# Patient Record
Sex: FEMALE | Race: BLACK OR AFRICAN AMERICAN | Employment: FULL TIME | URBAN - METROPOLITAN AREA
[De-identification: names, ages, dates, MRNs, and addresses within clinical notes are randomized per-mention and may not be internally consistent; named-entity substitution may affect disease eponyms.]

---

## 2018-07-09 ENCOUNTER — HOSPITAL ENCOUNTER (OUTPATIENT)
Dept: LAB | Age: 28
Discharge: HOME | End: 2018-07-09

## 2018-07-09 PROCEDURE — 88305 TISSUE EXAM BY PATHOLOGIST: CPT | Performed by: NURSE PRACTITIONER

## 2022-10-11 ENCOUNTER — OFFICE VISIT (OUTPATIENT)
Dept: OBGYN CLINIC | Age: 32
End: 2022-10-11
Payer: COMMERCIAL

## 2022-10-11 VITALS
WEIGHT: 186 LBS | SYSTOLIC BLOOD PRESSURE: 128 MMHG | DIASTOLIC BLOOD PRESSURE: 80 MMHG | HEIGHT: 64 IN | BODY MASS INDEX: 31.76 KG/M2

## 2022-10-11 DIAGNOSIS — N90.0 VULVAR INTRAEPITHELIAL NEOPLASIA (VIN) GRADE 1: ICD-10-CM

## 2022-10-11 DIAGNOSIS — Z01.419 WELL WOMAN EXAM WITH ROUTINE GYNECOLOGICAL EXAM: Primary | ICD-10-CM

## 2022-10-11 DIAGNOSIS — Z12.4 SCREENING FOR CERVICAL CANCER: ICD-10-CM

## 2022-10-11 PROCEDURE — 99214 OFFICE O/P EST MOD 30 MIN: CPT | Performed by: OBSTETRICS & GYNECOLOGY

## 2022-10-11 PROCEDURE — 99395 PREV VISIT EST AGE 18-39: CPT | Performed by: OBSTETRICS & GYNECOLOGY

## 2022-10-11 NOTE — PATIENT INSTRUCTIONS
What you should know about HPV, Cervical Cancer, and Genital Warts    CERVICAL CANCER IS CAUSED BY CERTAIN TYPES OF A VIRUS. Cervical cancer is cancer of the cervix (the lower part of the uterus that connects to the vagina). Unlike other cancers, cervical cancer is not hereditary. It's caused by certain types of a virus, human papillomavirus (HPV). When a woman becomes infected with one of these types of HPV, and the virus doesn't go away on its own, normal cells can develop in the lining of the cervix. If these cells are not found early, precancers and then cancer can develop. CERVICAL CANCER: IT'S NOT TOO EARLY TO THINK ABOUT IT. American Cancer Society (ACS) estimates, that 27 women a day will be diagnosed with cervical cancer in the [place] in 2008. While half of all women diagnosed with cervical cancer are between 34-50 years old, many of these women could have initially been exposed to cancer-causing HPV types in their teens and 19's. THERE ARE MORE THAN 30 TYPES OF GENITAL HPV. The types of HPV that cause cervical cancer are different from the types that cause genital warts. All HPV types that affect the genital area can cause abnormal Pap test.    80% OF WOMEN WILL HAVE HAD HPV IN THEIR LIFETIME. Both men and women can have HPV, and it is easily spread. Any type of genital contact with someone who had HPV can put you at risk - intercourse isn't necessary. And since there are often no signs or symptoms, many people don't know they are passing it on. There are about 6 million new cases of genital HPV in the [place] each year. There are more than 30 types of genital HPV, and most will clear on their own. But for some women who don't clear certain types of the virus, cervical cancer can develop. There's no way to predict who will or won't clear the virus. GENITAL WARTS:  ANOTHER DISEASE CAUSED BY HPV.   While certain types of HPV can cause cervical cancer, other types can cause genital warts. Genital warts are usually soft, flesh-colored growths that can be raised or flat, small or large, alone or in clusters. There are on estimated 1 million new cases of genital warts each year in the [place]. While genital warts are not life threatening, they can be life altering. There are a number of ways to treat genital warts, including creams, removal by burning, freezing, or laser, and surgery. However, even after treatment, genital warts can return. In fact, 25% of cases return within 3 months. PROTECTION WITH GARDASIL. GARDASIL is the only cervical cancer vaccine that helps protect against 4 types of HPV:  2 types that cause 70% of cervical cancer cases, and 2 more types that cause 90% of genital warts cases. GARDASIL is for girls and young women ages 5 to 32. GARDASIL is given as 3 injections over 6 months (0, 2 months, 6 months). Getting all 3 doses will allow you or your daugter to get the full benefits of GARDASIL. GARDASIL WORKS TO HELP PREVENT ILLNESS. Like other vaccines, GARDASIL works to help prevent illness. That's why its recommended that girls 6to 15years of age (and as young as 5) get vaccinated. HPV vaccination is a part of the recommended vaccination schedule defined by the Centers for Disease Control and Prevention (CDC). Leading medical organizations recommend HPV vaccination including the American Academy of Pediatrics (AAP), the American Academy of Family Physicians (AAFP), and the [employer]nec[employer] (ACOG). IT'S NOT TOO LATE TO GET VACCINATED. Only a doctor or health care professional can tell you if GARDASIL is right for you. But, if you're already sexually active, you may still benefit from [address]. That's because even if you have been exposed to HPV, it's unlikely that you have been infected with all 4 types of the virus covered by GARDASIL. CERVICAL CANCER SCREENINGS ARE IMPORTANT.   Vaccination with GARDASIL is important, but it does not replace routine cervical cancer screenings. Pap tests look for abnormal cervical cells in the lining of the cervix before they have a chance to become precancers and then cervical cancer. Most often this change takes a number of years. But in rare cases it can happen within a year. IMPORTANT INFORMATION ABOUT GARDASIL. Anyone who is allergic to the ingredients of GARDASIL, including those severely allergic to yeast, should not receive the vaccine. GARDASIL is not for women who are pregnant. GARDASIL does not treat cervical cancer or genital warts. GARDASIL may not fully protect everyone, and does not prevent all types of cervical cancer, so it's important to continue routine cervical cancer screenings. GARDASIL will not protect against diseases caused by other HPV types or against diseases not caused by HPV. The side effects include pain, swelling, itching, bruising, and redness at the injection site, headaches, fever, nausea, dizziness, vomiting, and fainting. GARDASIL is given as 3 injections over 6 months. Only a doctor or health care professional can decide if GARDASIL is right for you or your daughter. PLEASE READ THE PATIENT INFORMATION FOR GARDASIL AND DISCUSS IT WITH YOUR DOCTOR OR HEALTH CARE PROFESSIONAL. TWENTY MINUTES TO IMPROVED BLADDER CONTROL    Millions of women experience some degree of urinary incontinence or involuntary urination. The following pelvic floor exercises, called Kegel exercises, can help you to improve bladder control. Set aside some time in the morning, afternoon, and evening. Sit comfortably upright and relax. Although this is an important exercise, there is no need to strain. Before doing Kegel exercises, it is necessary to identify the pelvic-floor muscle. Place your finger inside your vagina. Squeeze around your finger. That's the muscle you want to exercise.   Once you have isolated the muscle several times, there is no need to place your finger in the vagina to do the exercise. 1.  SQUEEZE AND HOLD FOR 10 SECONDS          Then relax for 10 seconds. Remember, it is as important to relax as it is to squeeze this muscle. 2.  WHEN TO DO THIS EXERCISE          15 times in the morning          15 times in the afternoon          20 times at night          Try to work up to doing 25 exercises at one time. Initially, you may not be able to hold and squeeze for 10 seconds. Don't get            Discouraged. Over a 2-week period your technique should improve. 3  THINGS TO REMEMBER          A. Never use your stomach, legs or buttock muscles. Place your hand on  your abdomen while you are squeezing your pelvic                Floor muscle. If you feel your abdomen move, then you are also using these muscles. B.  Build strength slowly; don't expect results right away. C.  The great thing about this exercise is that after you bcome familiar with it, it can be done anyplace. No one can you see you                 Exercing this internal muscle. 4.   WHEN YOU WILL SEE A CHANGE           After about 2 weeks of doing this exercise you should notice fewer \"accidents\".      Vitamin D3 1,000 IU  Calcium Citrate 600mg

## 2022-10-18 LAB
CYTOLOGIST CVX/VAG CYTO: NORMAL
CYTOLOGY CVX/VAG DOC THIN PREP: NORMAL
HPV REFLEX: NORMAL
Lab: NORMAL
Lab: NORMAL
PATH REPORT.FINAL DX SPEC: NORMAL
STAT OF ADQ CVX/VAG CYTO-IMP: NORMAL

## 2022-11-14 ENCOUNTER — PROCEDURE VISIT (OUTPATIENT)
Dept: OBGYN CLINIC | Age: 32
End: 2022-11-14
Payer: COMMERCIAL

## 2022-11-14 VITALS — SYSTOLIC BLOOD PRESSURE: 120 MMHG | BODY MASS INDEX: 30.9 KG/M2 | DIASTOLIC BLOOD PRESSURE: 90 MMHG | WEIGHT: 180 LBS

## 2022-11-14 DIAGNOSIS — N94.6 DYSMENORRHEA: ICD-10-CM

## 2022-11-14 DIAGNOSIS — N90.0 VULVAR INTRAEPITHELIAL NEOPLASIA (VIN) GRADE 1: ICD-10-CM

## 2022-11-14 PROCEDURE — 99214 OFFICE O/P EST MOD 30 MIN: CPT | Performed by: OBSTETRICS & GYNECOLOGY

## 2022-11-14 PROCEDURE — 56821 COLPOSCOPY VULVA W/BIOPSY: CPT | Performed by: OBSTETRICS & GYNECOLOGY

## 2022-11-14 RX ORDER — ALBUTEROL SULFATE 2.5 MG/3ML
SOLUTION RESPIRATORY (INHALATION)
COMMUNITY
Start: 2022-11-09

## 2022-11-14 NOTE — PROGRESS NOTES
The patient is here for  problems including dysmen    HISTORY:      Patient's last menstrual period was 11/01/2022 (approximate). SEE BELOW      Current Outpatient Medications on File Prior to Visit   Medication Sig Dispense Refill    budesonide-formoterol (SYMBICORT) 160-4.5 MCG/ACT AERO Inhale 2 puffs into the lungs 2 times daily      albuterol (PROVENTIL) (2.5 MG/3ML) 0.083% nebulizer solution TAKE 3 ML (2.5 MG) BY NEBULIZATION EVERY 6 (SIX) HOURS AS NEEDED FOR SHORTNESS OF BREATH       No current facility-administered medications on file prior to visit. SEE LIST    ROS:      PHYSICAL EXAM:  Blood pressure (!) 120/90, weight 180 lb (81.6 kg), last menstrual period 11/01/2022. The patient appears well, alert, oriented x 3, in no distress. Lungs are clear. Heart RRR, no murmurs. Abdomen soft without tenderness, guarding, mass or organomegaly. Pelvic: bg     ASSESSMENT:DIAGNOSIS DISCUSSED INCLUDING DIFFERENTIALdysmen     PLAN:    No orders of the defined types were placed in this encounter. Complex high risk decision making many questions answered history reviewed precharting done required 30 minute of time discussing a vaiety of problems  goals are set  follow up planned       Fiorella1 Kevin Yung    Name:  Joanna Book     Date:  11/14/2022      Patient consented and blood pressure taken . A full lengthy discussion held regarding the details of the abn findings  The  procedure was described in detail including  the pros and cons ,length of procedure,anticipated pain level,activity and habits rec after the procedure and time for discussion,questions,comments and other were allowed . Items such as care,scarring and fu were covered. Anticipated treatment options were discussed as well.   The pt was for less nervous at the end of the exam        Bg pap  hx LENARD 1    today slight acetowhite  7 oclock bx       FINDINGS:                                                                                    LORETTA GAXIOLA Michaela Campoverde MD

## 2022-11-29 ENCOUNTER — OFFICE VISIT (OUTPATIENT)
Dept: OBGYN CLINIC | Age: 32
End: 2022-11-29
Payer: COMMERCIAL

## 2022-11-29 VITALS — DIASTOLIC BLOOD PRESSURE: 70 MMHG | BODY MASS INDEX: 30.9 KG/M2 | SYSTOLIC BLOOD PRESSURE: 110 MMHG | WEIGHT: 180 LBS

## 2022-11-29 DIAGNOSIS — N90.0 VULVAR INTRAEPITHELIAL NEOPLASIA (VIN) GRADE 1: Primary | ICD-10-CM

## 2022-11-29 PROCEDURE — 99214 OFFICE O/P EST MOD 30 MIN: CPT | Performed by: OBSTETRICS & GYNECOLOGY

## 2023-07-18 ENCOUNTER — TELEPHONE (OUTPATIENT)
Dept: OBGYN CLINIC | Age: 33
End: 2023-07-18

## 2023-10-08 SDOH — ECONOMIC STABILITY: INCOME INSECURITY: HOW HARD IS IT FOR YOU TO PAY FOR THE VERY BASICS LIKE FOOD, HOUSING, MEDICAL CARE, AND HEATING?: NOT HARD AT ALL

## 2023-10-08 SDOH — ECONOMIC STABILITY: FOOD INSECURITY: WITHIN THE PAST 12 MONTHS, YOU WORRIED THAT YOUR FOOD WOULD RUN OUT BEFORE YOU GOT MONEY TO BUY MORE.: NEVER TRUE

## 2023-10-08 SDOH — ECONOMIC STABILITY: TRANSPORTATION INSECURITY
IN THE PAST 12 MONTHS, HAS LACK OF TRANSPORTATION KEPT YOU FROM MEETINGS, WORK, OR FROM GETTING THINGS NEEDED FOR DAILY LIVING?: NO

## 2023-10-08 SDOH — ECONOMIC STABILITY: HOUSING INSECURITY
IN THE LAST 12 MONTHS, WAS THERE A TIME WHEN YOU DID NOT HAVE A STEADY PLACE TO SLEEP OR SLEPT IN A SHELTER (INCLUDING NOW)?: NO

## 2023-10-08 SDOH — ECONOMIC STABILITY: FOOD INSECURITY: WITHIN THE PAST 12 MONTHS, THE FOOD YOU BOUGHT JUST DIDN'T LAST AND YOU DIDN'T HAVE MONEY TO GET MORE.: NEVER TRUE

## 2023-10-09 ENCOUNTER — OFFICE VISIT (OUTPATIENT)
Dept: OBGYN CLINIC | Age: 33
End: 2023-10-09
Payer: COMMERCIAL

## 2023-10-09 VITALS
WEIGHT: 184 LBS | BODY MASS INDEX: 31.41 KG/M2 | DIASTOLIC BLOOD PRESSURE: 80 MMHG | HEIGHT: 64 IN | SYSTOLIC BLOOD PRESSURE: 138 MMHG

## 2023-10-09 DIAGNOSIS — Z12.4 SCREENING FOR CERVICAL CANCER: ICD-10-CM

## 2023-10-09 DIAGNOSIS — Z11.3 SCREENING FOR STD (SEXUALLY TRANSMITTED DISEASE): ICD-10-CM

## 2023-10-09 DIAGNOSIS — Z11.51 SCREENING FOR HPV (HUMAN PAPILLOMAVIRUS): ICD-10-CM

## 2023-10-09 DIAGNOSIS — Z01.419 WELL WOMAN EXAM WITH ROUTINE GYNECOLOGICAL EXAM: Primary | ICD-10-CM

## 2023-10-09 PROCEDURE — 99395 PREV VISIT EST AGE 18-39: CPT | Performed by: OBSTETRICS & GYNECOLOGY

## 2023-10-10 LAB
HBV SURFACE AG SER QL: NONREACTIVE
HCV AB SER QL: NONREACTIVE
HIV 1+2 AB+HIV1 P24 AG SERPL QL IA: NONREACTIVE
HIV 1/2 RESULT COMMENT: NORMAL
RPR SER QL: NONREACTIVE

## 2023-10-15 LAB
C TRACH RRNA CVX QL NAA+PROBE: NEGATIVE
CYTOLOGIST CVX/VAG CYTO: NORMAL
CYTOLOGY CVX/VAG DOC THIN PREP: NORMAL
HPV APTIMA: NEGATIVE
HPV GENOTYPE REFLEX: NORMAL
Lab: NORMAL
N GONORRHOEA RRNA CVX QL NAA+PROBE: NEGATIVE
PATH REPORT.FINAL DX SPEC: NORMAL
STAT OF ADQ CVX/VAG CYTO-IMP: NORMAL
T VAGINALIS RRNA SPEC QL NAA+PROBE: NEGATIVE

## 2024-07-08 ENCOUNTER — PROCEDURE VISIT (OUTPATIENT)
Dept: OBGYN CLINIC | Age: 34
End: 2024-07-08
Payer: COMMERCIAL

## 2024-07-08 VITALS
SYSTOLIC BLOOD PRESSURE: 122 MMHG | BODY MASS INDEX: 29.37 KG/M2 | DIASTOLIC BLOOD PRESSURE: 70 MMHG | HEIGHT: 64 IN | WEIGHT: 172 LBS

## 2024-07-08 DIAGNOSIS — N90.4 LEUKOPLAKIA OF VULVA: ICD-10-CM

## 2024-07-08 DIAGNOSIS — N90.0 VULVAR INTRAEPITHELIAL NEOPLASIA (VIN) GRADE 1: Primary | ICD-10-CM

## 2024-07-08 PROCEDURE — 99214 OFFICE O/P EST MOD 30 MIN: CPT | Performed by: OBSTETRICS & GYNECOLOGY

## 2024-07-08 PROCEDURE — 56820 COLPOSCOPY VULVA: CPT | Performed by: OBSTETRICS & GYNECOLOGY

## 2024-07-08 NOTE — PROGRESS NOTES
The patient is here for  problems including hx devang 1    HISTORY:      No LMP recorded.SEE BELOW      Current Outpatient Medications on File Prior to Visit   Medication Sig Dispense Refill    budesonide-formoterol (SYMBICORT) 160-4.5 MCG/ACT AERO Inhale 2 puffs into the lungs 2 times daily       No current facility-administered medications on file prior to visit.   SEE LIST    ROS:      PHYSICAL EXAM:  Height 1.626 m (5' 4\").    The patient appears well, alert, oriented x 3, in no distress.  Lungs are clear. Heart RRR, no murmurs. Abdomen soft without tenderness, guarding, mass or organomegaly.  Pelvic: bg     ASSESSMENT:DIAGNOSIS DISCUSSED INCLUDING DIFFERENTIAL   Last colpo bg bx  7oclock 11/22          today decl bx  6 o clock bx pending   shyam!!!!  PLAN:    No orders of the defined types were placed in this encounter.    Complex high risk decision making many questions answered history reviewed precharting done required 30 minute of time discussing a vaiety of problems  goals are set  follow up planned

## 2024-08-26 ENCOUNTER — PROCEDURE VISIT (OUTPATIENT)
Dept: OBGYN CLINIC | Age: 34
End: 2024-08-26
Payer: COMMERCIAL

## 2024-08-26 VITALS — WEIGHT: 172 LBS | DIASTOLIC BLOOD PRESSURE: 94 MMHG | BODY MASS INDEX: 29.52 KG/M2 | SYSTOLIC BLOOD PRESSURE: 150 MMHG

## 2024-08-26 DIAGNOSIS — B97.7 HPV IN FEMALE: Primary | ICD-10-CM

## 2024-08-26 DIAGNOSIS — Z01.812 PRE-PROCEDURE LAB EXAM: ICD-10-CM

## 2024-08-26 DIAGNOSIS — N90.0 VULVAR INTRAEPITHELIAL NEOPLASIA (VIN) GRADE 1: ICD-10-CM

## 2024-08-26 LAB
HCG, PREGNANCY, URINE, POC: NEGATIVE
VALID INTERNAL CONTROL, POC: YES

## 2024-08-26 PROCEDURE — 81025 URINE PREGNANCY TEST: CPT | Performed by: OBSTETRICS & GYNECOLOGY

## 2024-08-26 PROCEDURE — 99214 OFFICE O/P EST MOD 30 MIN: CPT | Performed by: OBSTETRICS & GYNECOLOGY

## 2024-08-26 PROCEDURE — 56821 COLPOSCOPY VULVA W/BIOPSY: CPT | Performed by: OBSTETRICS & GYNECOLOGY

## 2024-08-26 RX ORDER — ALBUTEROL SULFATE 0.83 MG/ML
2.5 SOLUTION RESPIRATORY (INHALATION) EVERY 6 HOURS PRN
COMMUNITY
Start: 2024-06-26 | End: 2025-07-26

## 2024-08-26 RX ORDER — ALBUTEROL SULFATE 90 UG/1
2 AEROSOL, METERED RESPIRATORY (INHALATION) EVERY 4 HOURS PRN
COMMUNITY
Start: 2024-06-26

## 2024-08-26 RX ORDER — FLUTICASONE PROPIONATE 50 MCG
2 SPRAY, SUSPENSION (ML) NASAL
COMMUNITY
Start: 2023-06-26

## 2024-08-26 RX ORDER — CETIRIZINE HYDROCHLORIDE 10 MG/1
10 TABLET ORAL DAILY
COMMUNITY
Start: 2024-06-26 | End: 2024-09-24

## 2024-08-26 ASSESSMENT — PATIENT HEALTH QUESTIONNAIRE - PHQ9
1. LITTLE INTEREST OR PLEASURE IN DOING THINGS: NOT AT ALL
SUM OF ALL RESPONSES TO PHQ QUESTIONS 1-9: 0
SUM OF ALL RESPONSES TO PHQ9 QUESTIONS 1 & 2: 0
2. FEELING DOWN, DEPRESSED OR HOPELESS: NOT AT ALL
SUM OF ALL RESPONSES TO PHQ QUESTIONS 1-9: 0

## 2024-08-26 NOTE — PROGRESS NOTES
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     HISTORY:      Patient's last menstrual period was 2024 (exact date).SEE BELOW      Current Outpatient Medications on File Prior to Visit   Medication Sig Dispense Refill    albuterol (PROVENTIL) (2.5 MG/3ML) 0.083% nebulizer solution Inhale 3 mLs into the lungs every 6 hours as needed      albuterol sulfate HFA (PROVENTIL;VENTOLIN;PROAIR) 108 (90 Base) MCG/ACT inhaler Inhale 2 puffs into the lungs every 4 hours as needed      cetirizine (ZYRTEC) 10 MG tablet Take 1 tablet by mouth daily      fluticasone (FLONASE) 50 MCG/ACT nasal spray 2 sprays by Nasal route      budesonide-formoterol (SYMBICORT) 160-4.5 MCG/ACT AERO Inhale 2 puffs into the lungs 2 times daily       No current facility-administered medications on file prior to visit.   SEE LIST    ROS:      PHYSICAL EXAM:  Blood pressure (!) 150/94, weight 78 kg (172 lb), last menstrual period 2024.    The patient appears well, alert, oriented x 3, in no distress.  Lungs are clear. Heart RRR, no murmurs. Abdomen soft without tenderness, guarding, mass or organomegaly.  Pelvic: bg     ASSESSMENT:DIAGNOSIS DISCUSSED INCLUDING DIFFERENTIAL  Pap due   hpv in female  dx gardisil  transmission   PLAN:    No orders of the defined types were placed in this encounter.    Many questions answered history reviewed precharting done required 30 minute of time discussing a vaiety of problems  goals are set  follow up planned     Vulvar/Perineal Biopsy w colposcopy       2024     Name:  Kelli Sutton  :   1990  Age:   34 y.o.    Reason for Procedure:      Procedure:  Patient was placed in the lithotomy position.  The area was cleansed with betadine, then infiltrated with plain 1% xylocaine.  Subsequently  was performed with   The area  sutured closed with 3-0vicryl w colposcopy and acetic  hx LENARD [vulvar ]    Small dressing was applied.  Patient tolerated the procedure well.

## 2024-09-17 ENCOUNTER — OFFICE VISIT (OUTPATIENT)
Dept: OBGYN CLINIC | Age: 34
End: 2024-09-17
Payer: COMMERCIAL

## 2024-09-17 VITALS — DIASTOLIC BLOOD PRESSURE: 60 MMHG | SYSTOLIC BLOOD PRESSURE: 110 MMHG | BODY MASS INDEX: 30.55 KG/M2 | WEIGHT: 178 LBS

## 2024-09-17 DIAGNOSIS — N90.89 VULVAR LESION: Primary | ICD-10-CM

## 2024-09-17 PROCEDURE — 99214 OFFICE O/P EST MOD 30 MIN: CPT | Performed by: OBSTETRICS & GYNECOLOGY

## 2024-11-18 ENCOUNTER — OFFICE VISIT (OUTPATIENT)
Dept: OBGYN CLINIC | Age: 34
End: 2024-11-18
Payer: COMMERCIAL

## 2024-11-18 VITALS — BODY MASS INDEX: 30.9 KG/M2 | WEIGHT: 180 LBS | DIASTOLIC BLOOD PRESSURE: 68 MMHG | SYSTOLIC BLOOD PRESSURE: 110 MMHG

## 2024-11-18 DIAGNOSIS — Z12.4 SCREENING FOR CERVICAL CANCER: ICD-10-CM

## 2024-11-18 DIAGNOSIS — Z01.419 WELL WOMAN EXAM WITH ROUTINE GYNECOLOGICAL EXAM: Primary | ICD-10-CM

## 2024-11-18 DIAGNOSIS — Z11.51 SCREENING FOR HPV (HUMAN PAPILLOMAVIRUS): ICD-10-CM

## 2024-11-18 PROCEDURE — 99395 PREV VISIT EST AGE 18-39: CPT | Performed by: OBSTETRICS & GYNECOLOGY

## 2024-11-18 SDOH — ECONOMIC STABILITY: FOOD INSECURITY: WITHIN THE PAST 12 MONTHS, YOU WORRIED THAT YOUR FOOD WOULD RUN OUT BEFORE YOU GOT MONEY TO BUY MORE.: NEVER TRUE

## 2024-11-18 SDOH — ECONOMIC STABILITY: FOOD INSECURITY: WITHIN THE PAST 12 MONTHS, THE FOOD YOU BOUGHT JUST DIDN'T LAST AND YOU DIDN'T HAVE MONEY TO GET MORE.: NEVER TRUE

## 2024-11-18 SDOH — ECONOMIC STABILITY: INCOME INSECURITY: HOW HARD IS IT FOR YOU TO PAY FOR THE VERY BASICS LIKE FOOD, HOUSING, MEDICAL CARE, AND HEATING?: NOT HARD AT ALL

## 2024-11-18 NOTE — PROGRESS NOTES
Kelli  is a 34 y.o. No obstetric history on file.  who is here for an annual exam.      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 Li)    GYN      laser  to vulva, COLPO, VULVA BIOPSY X2    PRESENT IN FILE  Current Outpatient Medications on File Prior to Visit   Medication Sig Dispense Refill    albuterol (PROVENTIL) (2.5 MG/3ML) 0.083% nebulizer solution Inhale 3 mLs into the lungs every 6 hours as needed      fluticasone (FLONASE) 50 MCG/ACT nasal spray 2 sprays by Nasal route      budesonide-formoterol (SYMBICORT) 160-4.5 MCG/ACT AERO Inhale 2 puffs into the lungs 2 times daily      albuterol sulfate HFA (PROVENTIL;VENTOLIN;PROAIR) 108 (90 Base) MCG/ACT inhaler Inhale 2 puffs into the lungs every 4 hours as needed       No current facility-administered medications on file prior to visit.   SEE UPDATED LIST  No Known AllergiesSEE LIST  Social History     Tobacco Use    Smoking status: Never    Smokeless tobacco: Never   Substance Use Topics    Alcohol use: Yes     Comment: Ocassional social drinker 1-2 per month      Family History   Problem Relation Age of Onset    Hypertension Mother      OBGYN History:             Physical Exam  Blood pressure 110/68, weight 81.6 kg (180 lb), last menstrual period 2024. Body mass index is 30.9 kg/m².  No results found for: \"HGB\", \"HGBP\"   @LASTPROCAMB([id number])@  No results found for: \"HCGUQC\", \"THCGA1\"    HEENT unremarkable. Sclera non-icteric.  Neck is supple

## 2024-11-22 LAB
COLLECTION METHOD: NORMAL
CYTOLOGIST CVX/VAG CYTO: NORMAL
CYTOLOGY CVX/VAG DOC THIN PREP: NORMAL
DATE OF LMP: NORMAL
HPV APTIMA: NEGATIVE
Lab: NORMAL
OTHER PT INFO: NORMAL
PAP SOURCE: NORMAL
PATH REPORT.FINAL DX SPEC: NORMAL
PREV CYTO INFO: NORMAL
PREV TREATMENT RESULTS: NEGATIVE
PREV TREATMENT: NORMAL
STAT OF ADQ CVX/VAG CYTO-IMP: NORMAL